# Patient Record
Sex: MALE | Race: BLACK OR AFRICAN AMERICAN | URBAN - METROPOLITAN AREA
[De-identification: names, ages, dates, MRNs, and addresses within clinical notes are randomized per-mention and may not be internally consistent; named-entity substitution may affect disease eponyms.]

---

## 2017-04-04 ENCOUNTER — HOSPITAL ENCOUNTER (EMERGENCY)
Facility: CLINIC | Age: 42
Discharge: HOME | End: 2017-04-04
Attending: EMERGENCY MEDICINE | Admitting: EMERGENCY MEDICINE

## 2017-04-04 VITALS
OXYGEN SATURATION: 99 % | RESPIRATION RATE: 18 BRPM | HEART RATE: 59 BPM | WEIGHT: 185 LBS | HEIGHT: 71 IN | DIASTOLIC BLOOD PRESSURE: 87 MMHG | BODY MASS INDEX: 25.9 KG/M2 | SYSTOLIC BLOOD PRESSURE: 135 MMHG | TEMPERATURE: 98.2 F

## 2017-04-04 DIAGNOSIS — R07.89 ATYPICAL CHEST PAIN: ICD-10-CM

## 2017-04-04 LAB — TROPONIN I BLD-MCNC: 0 UG/L (ref 0–0.1)

## 2017-04-04 PROCEDURE — 84484 ASSAY OF TROPONIN QUANT: CPT

## 2017-04-04 PROCEDURE — 93010 ELECTROCARDIOGRAM REPORT: CPT | Mod: Z6 | Performed by: EMERGENCY MEDICINE

## 2017-04-04 PROCEDURE — 99284 EMERGENCY DEPT VISIT MOD MDM: CPT | Performed by: EMERGENCY MEDICINE

## 2017-04-04 PROCEDURE — 36415 COLL VENOUS BLD VENIPUNCTURE: CPT | Performed by: EMERGENCY MEDICINE

## 2017-04-04 PROCEDURE — 99284 EMERGENCY DEPT VISIT MOD MDM: CPT | Mod: Z6 | Performed by: EMERGENCY MEDICINE

## 2017-04-04 PROCEDURE — 93005 ELECTROCARDIOGRAM TRACING: CPT | Performed by: EMERGENCY MEDICINE

## 2017-04-04 ASSESSMENT — ENCOUNTER SYMPTOMS
DIAPHORESIS: 0
CHEST TIGHTNESS: 0
SHORTNESS OF BREATH: 0
GASTROINTESTINAL NEGATIVE: 1
PALPITATIONS: 0

## 2017-04-04 NOTE — ED AVS SNAPSHOT
[facility], Emergency Department    [address]    [place] [address]    Phone:  [phone]    Fax:  [phone]                                       Theo Romero   MRN: [id number]    Department:  [facility], Emergency Department   Date of Visit:  4/4/2017           Patient Information     Date Of Birth          1975        Your diagnoses for this visit were:     Atypical chest pain        You were seen by Joselito Yusuf MD.        Discharge Instructions       Your EKG and troponin blood test for heart injury are both normal  You should consider getting a primary care provider for evaluation of blood pressure and cardiac risk factors including lipids (cholesterol)      24 Hour Appointment Hotline       To make an appointment at any [facility] clinic, call [phone] ([phone]). If you don't have a family doctor or clinic, we will help you find one. [facility] clinics are conveniently located to serve the needs of you and your family.             Review of your medicines      Notice     You have not been prescribed any medications.            Procedures and tests performed during your visit     EKG 12 lead    ISTAT troponin nursing POCT    Troponin POCT      Orders Needing Specimen Collection     None      Pending Results     No orders found from 4/2/2017 to 4/5/2017.            Pending Culture Results     No orders found from 4/2/2017 to 4/5/2017.            Thank you for choosing [facility]       Thank you for choosing [facility] for your care. Our goal is always to provide you with excellent care. Hearing back from our patients is one way we can continue to improve our services. Please take a few minutes to complete the written survey that you may receive in the mail after you visit with us. Thank you!        [employer] Information     [employer] lets you send messages to your doctor, view your test results, renew your prescriptions, schedule appointments and more. To sign up, go  "to [email or web address] . Click on \"Log in\" on the left side of the screen, which will take you to the Welcome page. Then click on \"Sign up Now\" on the right side of the page.     You will be asked to enter the access code listed below, as well as some personal information. Please follow the directions to create your username and password.     Your access code is: [email or web address]  Expires: 7/3/2017  7:04 PM     Your access code will  in 90 days. If you need help or a new code, please call your [place] clinic or [phone].        Care EveryWhere ID     This is your Care EveryWhere ID. This could be used by other organizations to access your [place] [facility] medical records  [id number]        After Visit Summary       This is your record. Keep this with you and show to your community pharmacist(s) and doctor(s) at your next visit.                  "

## 2017-04-04 NOTE — ED AVS SNAPSHOT
[facility], [place], Emergency Department    [address]    [facility] [address]    Phone:  [phone]    Fax:  [phone]                                       Theo Romero   MRN: [id number]    Department:  [facility], Emergency Department   Date of Visit:  4/4/2017           After Visit Summary Signature Page     I have received my discharge instructions, and my questions have been answered. I have discussed any challenges I see with this plan with the nurse or doctor.    ..........................................................................................................................................  Patient/Patient Representative Signature      ..........................................................................................................................................  Patient Representative Print Name and Relationship to Patient    ..................................................               ................................................  Date                                            Time    ..........................................................................................................................................  Reviewed by Signature/Title    ...................................................              ..............................................  Date                                                            Time

## 2017-04-04 NOTE — ED PROVIDER NOTES
"  History     Chief Complaint   Patient presents with     Chest Pain     Pt c/o midsternal chest pain. Is reproducable. Pt states his pain moves around.      HPI  Theo Roemro is a 41 year old otherwise healthy male who presents to the ED today complaining of chest pain. Patient reports sharp, mid sternal chest pain that radiates to the left and right side of the chest since January. He notes pain is worse with activity. Patient believes pain may be triggered by working out or coconut oil in his food. He denies difficulty breathing or shortness of breath. He denies fever or weight changes.      I have reviewed the Medications, Allergies, Past Medical and Surgical History, and Social History in the Epic system.      PAST MEDICAL HISTORY: History reviewed. No pertinent past medical history.    PAST SURGICAL HISTORY: History reviewed. No pertinent surgical history.    FAMILY HISTORY: No family history on file.    SOCIAL HISTORY:   Social History   Substance Use Topics     Smoking status: Never Smoker     Smokeless tobacco: Not on file     Alcohol use Not on file     No current facility-administered medications for this encounter.      No current outpatient prescriptions on file.      No Known Allergies      Review of Systems   Constitutional: Negative for diaphoresis.   Respiratory: Negative for chest tightness and shortness of breath.    Cardiovascular: Positive for chest pain (sharp intermittent). Negative for palpitations and leg swelling.   Gastrointestinal: Negative.    All other systems reviewed and are negative.      Physical Exam   BP: (!) 156/98  Pulse: 61  Temp: 98.3  F (36.8  C)  Resp: 16  Height: 180.3 cm (5' 11\")  Weight: 83.9 kg (185 lb)  SpO2: 97 %  Physical Exam   Constitutional: He is oriented to person, place, and time. He appears well-developed and well-nourished. No distress.   Neck: Neck supple.   Cardiovascular: Normal rate, regular rhythm and normal heart sounds.    No murmur " heard.  Pulmonary/Chest: Effort normal and breath sounds normal. He has no wheezes. He has no rales.   Neurological: He is alert and oriented to person, place, and time.   Psychiatric: He has a normal mood and affect. His behavior is normal.   Nursing note and vitals reviewed.      ED Course     ED Course     Procedures             EKG Interpretation:      Interpreted by Joselito Yusuf  Time reviewed: 6:59 PM   Symptoms at time of EKG: CP   Rhythm: normal sinus   Rate: normal  Axis: normal  Ectopy: none  Conduction: normal  ST Segments/ T Waves: No ST-T wave changes  Q Waves: none  Comparison to prior: No old EKG available    Clinical Impression: normal EKG        Trop 0       Labs Ordered and Resulted from Time of ED Arrival Up to the Time of Departure from the ED   ISTAT TROPONIN NURSING POCT   TROPONIN POCT       Assessments & Plan (with Medical Decision Making)   41-year-old male who is very healthy and health conscious.  He has been having some sharp intermittent chest discomfort primarily with lifting weights that seems chest wall to me.  He has no cardiac risk factors. His EKG is normal and a troponin is negative. At this point I don t feel he needs a chest x-ray or cardiac stress test.  I told him he would benefit from a primary care provider. He lives in [place] but works in the [place]. His triage blood pressure was 156/98.  I told him I think that getting a primary care and ensuring that he doesn t have underlying hypertension or hyperlipidemia was more important at this point than any additional evaluation of what sounds like a musculoskeletal chest pain.    This part of the document was transcribed by Radha Dalal Scribe.       I have reviewed the nursing notes.    I have reviewed the findings, diagnosis, plan and need for follow up with the patient.    New Prescriptions    No medications on file       Final diagnoses:   Atypical chest pain       4/4/2017   [facility], [place],  EMERGENCY DEPARTMENT     Joselito Yusuf MD  04/04/17 1913

## 2017-04-04 NOTE — DISCHARGE INSTRUCTIONS
Your EKG and troponin blood test for heart injury are both normal  You should consider getting a primary care provider for evaluation of blood pressure and cardiac risk factors including lipids (cholesterol)

## 2017-04-05 LAB — INTERPRETATION ECG - MUSE: NORMAL
